# Patient Record
Sex: FEMALE | Race: WHITE
[De-identification: names, ages, dates, MRNs, and addresses within clinical notes are randomized per-mention and may not be internally consistent; named-entity substitution may affect disease eponyms.]

---

## 2023-07-28 ENCOUNTER — NURSE TRIAGE (OUTPATIENT)
Dept: OTHER | Facility: CLINIC | Age: 68
End: 2023-07-28

## 2023-07-28 NOTE — TELEPHONE ENCOUNTER
Location of patient: [place]    Subjective: Caller states \"right jaw pain and swelling\"     Current Symptoms: right upper jaw pain with swelling    Onset: 2 days ago; worsening    Associated Symptoms: reduced activity, reduced appetite    Pain Severity: 7/10; dull, aching; constant    Temperature: none     What has been tried: tylenol 1000 mg yesterday. LMP: NA Pregnant: NA    Recommended disposition: See HCP within 4 Hours (or PCP triage)    Care advice provided, patient verbalizes understanding; denies any other questions or concerns; instructed to call back for any new or worsening symptoms. Patient/caller warm transferred to [facility] at [address] [facility] for appointment scheduling    This triage is a result of a call to [address] [place] [address]. Please do not respond to the triage nurse through this encounter. Any subsequent communication should be directly with the patient. Reason for Disposition   [1] Swollen area of face AND [2] toothache    Protocols used:  Face Pain-ADULT-